# Patient Record
Sex: FEMALE | Race: AMERICAN INDIAN OR ALASKA NATIVE
[De-identification: names, ages, dates, MRNs, and addresses within clinical notes are randomized per-mention and may not be internally consistent; named-entity substitution may affect disease eponyms.]

---

## 2018-08-12 ENCOUNTER — HOSPITAL ENCOUNTER (EMERGENCY)
Dept: HOSPITAL 5 - ED | Age: 28
LOS: 5 days | Discharge: HOME | End: 2018-08-17
Payer: SELF-PAY

## 2018-08-12 DIAGNOSIS — Z88.0: ICD-10-CM

## 2018-08-12 DIAGNOSIS — F41.9: ICD-10-CM

## 2018-08-12 DIAGNOSIS — F31.9: Primary | ICD-10-CM

## 2018-08-12 DIAGNOSIS — F17.200: ICD-10-CM

## 2018-08-12 LAB
BASOPHILS # (AUTO): 0.1 K/MM3 (ref 0–0.1)
BASOPHILS NFR BLD AUTO: 0.8 % (ref 0–1.8)
BUN SERPL-MCNC: 9 MG/DL (ref 7–17)
BUN/CREAT SERPL: 10 %
CALCIUM SERPL-MCNC: 9.6 MG/DL (ref 8.4–10.2)
EOSINOPHIL # BLD AUTO: 0.2 K/MM3 (ref 0–0.4)
EOSINOPHIL NFR BLD AUTO: 1.4 % (ref 0–4.3)
HCT VFR BLD CALC: 29.2 % (ref 30.3–42.9)
HEMOLYSIS INDEX: 2
HGB BLD-MCNC: 8.7 GM/DL (ref 10.1–14.3)
LYMPHOCYTES # BLD AUTO: 3.6 K/MM3 (ref 1.2–5.4)
LYMPHOCYTES NFR BLD AUTO: 35.1 % (ref 13.4–35)
MCH RBC QN AUTO: 18 PG (ref 28–32)
MCHC RBC AUTO-ENTMCNC: 30 % (ref 30–34)
MCV RBC AUTO: 61 FL (ref 79–97)
MONOCYTES # (AUTO): 0.7 K/MM3 (ref 0–0.8)
MONOCYTES % (AUTO): 6.3 % (ref 0–7.3)
PLATELET # BLD: 530 K/MM3 (ref 140–440)
RBC # BLD AUTO: 4.81 M/MM3 (ref 3.65–5.03)

## 2018-08-12 PROCEDURE — 85025 COMPLETE CBC W/AUTO DIFF WBC: CPT

## 2018-08-12 PROCEDURE — 80320 DRUG SCREEN QUANTALCOHOLS: CPT

## 2018-08-12 PROCEDURE — 84484 ASSAY OF TROPONIN QUANT: CPT

## 2018-08-12 PROCEDURE — 96360 HYDRATION IV INFUSION INIT: CPT

## 2018-08-12 PROCEDURE — 96361 HYDRATE IV INFUSION ADD-ON: CPT

## 2018-08-12 PROCEDURE — 82550 ASSAY OF CK (CPK): CPT

## 2018-08-12 PROCEDURE — 99284 EMERGENCY DEPT VISIT MOD MDM: CPT

## 2018-08-12 PROCEDURE — 81001 URINALYSIS AUTO W/SCOPE: CPT

## 2018-08-12 PROCEDURE — 36415 COLL VENOUS BLD VENIPUNCTURE: CPT

## 2018-08-12 PROCEDURE — 83735 ASSAY OF MAGNESIUM: CPT

## 2018-08-12 PROCEDURE — 84703 CHORIONIC GONADOTROPIN ASSAY: CPT

## 2018-08-12 PROCEDURE — 80307 DRUG TEST PRSMV CHEM ANLYZR: CPT

## 2018-08-12 PROCEDURE — 80048 BASIC METABOLIC PNL TOTAL CA: CPT

## 2018-08-12 PROCEDURE — G0480 DRUG TEST DEF 1-7 CLASSES: HCPCS

## 2018-08-12 NOTE — EMERGENCY DEPARTMENT REPORT
History of Present Illness





- General


Chief Complaint: Overdose


Stated Complaint: SUICIDE ATTEMPT


Time Seen by Provider: 08/12/18 21:53


Source: patient, EMS


Mode of arrival: Stretcher


Limitations: No Limitations





- History of Present Illness


Initial Comments: 





Patient complains of having overdosed on several capsules of Benadryl, several 

tablets of clonazepam.  Patient is awake and alert but minimally cooperative, 

refuses to answer most questions.  Mother is in attendance, provides much of 

the pertinent history of circumstances.  Mother reports that patient has a long 

history of depression, with past suicide attempt approximately 10 years ago, 

has visited multiple counselors, diagnosed with depression, mother thinks the 

patient may be bipolar, but cannot recall a distinct diagnosis, the patient has 

routinely refused to take medications prescribed by her counselors.





Mother reports that patient does not talk much with mother also, but mother 

reports that patient has a daughter, with shared custody with father, and 

visits or held most weekends, but patient's child has not been with mother for 

the past couple of weekends, for unknown reasons.  Mother is not sure if 

patient has refused visited by child, or if visits have been withheld, but is 

unaware of circumstances or relationship with child's father today, due to 

patient's refusal to discuss much with mother.





Mother reports patient has not been sick recently, is significantly obese, but 

otherwise has no major health problems otherwise.





Nurse reports patient had taken a total of 26 Benadryl capsules, 50 mg each, as 

well as 6 Klonopin tablets, which had been prescribed for a friend.  There is 

no other report of additional ingestion.





- Related Data


 Previous Rx's











 Medication  Instructions  Recorded  Last Taken  Type


 


Clindamycin [Clindamycin CAP] 300 mg PO Q8H #21 cap 11/25/16 Unknown Rx


 


HYDROcodone/APAP 5-325 [[place] 1 each PO Q6HR PRN #6 tablet 11/25/16 Unknown Rx





5/325]    


 


Ibuprofen [Motrin 800 MG tab] 800 mg PO Q8HR PRN #25 tablet 11/25/16 Unknown Rx











 Allergies











Allergy/AdvReac Type Severity Reaction Status Date / Time


 


Penicillins Allergy  Unknown Verified 11/25/16 02:44














ED Review of Systems


ROS: 


Stated complaint: SUICIDE ATTEMPT


Other details as noted in HPI





Comment: Unobtainable due to pts medical conditions (patient refusal to assist)


Constitutional: no symptoms reported (per mother).  denies: chills, fever


Respiratory: no symptoms reported.  denies: cough, shortness of breath, wheezing


Cardiovascular: as per HPI (per mother).  denies: chest pain, palpitations


Gastrointestinal: as per HPI (per mother).  denies: abdominal pain, nausea, 

diarrhea


Psychiatric: depression (chronic, no recent observed changes in mental status, 

demeanor, or general condition, per mother)





ED Past Medical Hx





- Past Medical History


Previous Medical History?: Yes


Hx Hypertension: No


Hx CVA: No


Hx Heart Attack/AMI: No


Hx Congestive Heart Failure: No


Hx Diabetes: No


Hx Deep Vein Thrombosis: No


Hx Pulmonary Embolism: No


Hx GERD: No


Hx Liver Disease: No


Hx Renal Disease: No


Hx Sickle Cell Disease: No


Hx Arthritis: No


Hx Headaches / Migraines: No


Hx Seizures: No


Hx Kidney Stones: No


Hx Psychiatric Treatment: Yes (Anxiety and suicide attempt)


Hx Asthma: No


Hx COPD: No


Hx Dementia: No


Hx HIV: No





- Surgical History


Past Surgical History?: No


Hx Coronary Stent: No


Hx Open Heart Surgery: No


Hx Pacemaker: No


Hx Internal Defibrillator: No


Hx Cholecystectomy: No


Hx Appendectomy: No


Hx Breast Surgery: No





- Social History


Smoking Status: Current Every Day Smoker


Substance Use Type: None





- Medications


Home Medications: 


 Home Medications











 Medication  Instructions  Recorded  Confirmed  Last Taken  Type


 


Clindamycin [Clindamycin CAP] 300 mg PO Q8H #21 cap 11/25/16  Unknown Rx


 


HYDROcodone/APAP 5-325 [[place] 1 each PO Q6HR PRN #6 tablet 11/25/16  Unknown Rx





5/325]     


 


Ibuprofen [Motrin 800 MG tab] 800 mg PO Q8HR PRN #25 tablet 11/25/16  Unknown Rx














ED Physical Exam





- General


Limitations: Other (patient does not drink it, refuses to answer many questions

, but does allow brief physical examination)


General appearance: alert, in no apparent distress





- Head


Head exam: Present: atraumatic, normocephalic





- Eye


Eye exam: Present: PERRL (pupils are moderately dilated, 5 mm, equally 

responsive), EOMI





- ENT


ENT exam: Present: normal exam, mucous membranes moist





- Neck


Neck exam: Present: normal inspection, full ROM.  Absent: tenderness





- Respiratory


Respiratory exam: Present: normal lung sounds bilaterally.  Absent: respiratory 

distress





- Cardiovascular


Cardiovascular Exam: Present: regular rate, tachycardia (borderline tachycardia

, heart rate 104 at time of examination, 116 at admission)





- GI/Abdominal


GI/Abdominal exam: Present: soft, normal bowel sounds.  Absent: tenderness





- Rectal


Rectal exam: Present: deferred





- Extremities Exam


Extremities exam: Present: normal inspection





- Back Exam


Back exam: Present: normal inspection.  Absent: tenderness





- Neurological Exam


Neurological exam: Present: alert, oriented X3, CN II-XII intact.  Absent: 

motor sensory deficit





- Psychiatric


Psychiatric exam: Present: flat affect (refuses to talk much of the time, 

unable to assess mood and demeanor, clearly well oriented, alert and tracks 

well with environment, no obvious signs of confusion)





- Skin


Skin exam: Present: warm, dry, intact, normal color.  Absent: diaphoretic





ED Course


 Vital Signs











  08/12/18 08/12/18 08/12/18





  21:22 21:30 21:34


 


Temperature   37.9 C H


 


Pulse Rate 121 H 115 H 116 H


 


Respiratory 18 16 28 H





Rate   


 


Blood Pressure  155/90 155/90


 


O2 Sat by Pulse 100 100 100





Oximetry   














  08/12/18 08/12/18 08/12/18





  21:46 21:49 22:00


 


Temperature   


 


Pulse Rate 106 H  112 H


 


Respiratory 15 17 20





Rate   


 


Blood Pressure 159/87  159/87


 


O2 Sat by Pulse 100 100 100





Oximetry   














  08/12/18 08/12/18 08/12/18





  22:16 22:31 22:45


 


Temperature   


 


Pulse Rate 108 H 107 H 125 H


 


Respiratory 16 14 15





Rate   


 


Blood Pressure 159/87 159/87 159/87


 


O2 Sat by Pulse 100 100 100





Oximetry   














  08/12/18 08/12/18 08/12/18





  23:15 23:17 23:31


 


Temperature  37.6 C 


 


Pulse Rate   124 H


 


Respiratory 21  25 H





Rate   


 


Blood Pressure   


 


O2 Sat by Pulse   





Oximetry   














  08/12/18 08/13/18 08/13/18





  23:47 00:01 00:15


 


Temperature   


 


Pulse Rate   131 H


 


Respiratory   25 H





Rate   


 


Blood Pressure   


 


O2 Sat by Pulse 83 L 97 





Oximetry   














  08/13/18 08/13/18 08/13/18





  00:30 00:45 01:00


 


Temperature   


 


Pulse Rate 123 H 122 H 122 H


 


Respiratory 23 29 H 25 H





Rate   


 


Blood Pressure 148/92 151/90 145/83


 


O2 Sat by Pulse   





Oximetry   














  08/13/18 08/13/18 08/13/18





  01:15 01:30 01:45


 


Temperature   


 


Pulse Rate 121 H 119 H 119 H


 


Respiratory 30 H 30 H 31 H





Rate   


 


Blood Pressure 158/85 152/82 125/65


 


O2 Sat by Pulse   





Oximetry   














  08/13/18 08/13/18 08/13/18





  02:01 02:15 02:31


 


Temperature   


 


Pulse Rate 118 H 116 H 110 H


 


Respiratory 26 H 29 H 31 H





Rate   


 


Blood Pressure 122/81 147/84 152/82


 


O2 Sat by Pulse   





Oximetry   














  08/13/18 08/13/18 08/13/18





  02:45 03:01 03:15


 


Temperature   


 


Pulse Rate 114 H 118 H 115 H


 


Respiratory 28 H 27 H 31 H





Rate   


 


Blood Pressure 152/82 152/82 148/83


 


O2 Sat by Pulse   





Oximetry   














  08/13/18





  03:30


 


Temperature 


 


Pulse Rate 118 H


 


Respiratory 26 H





Rate 


 


Blood Pressure 158/72


 


O2 Sat by Pulse 





Oximetry 














- Reevaluation(s)


Reevaluation #1: 





08/13/18 05:54


Patient passed the night comfortably, with no acute distress, vital signs have 

remained stable, and patient is awake, alert, converses normally, asymptomatic, 

and stable physically on recheck, with repeat blood pressure 125/72, pulse 

remained in the tachycardic range at 108, with given patient's corpulent habitus

, minimal physical activity, she may be close to resting tachycardia anyway.  

She shows no overt signs of antihistamine toxicity.





Patient is much more forthcoming about her current circumstances, denies any 

actual suicide intent,  and confirms taking diphenhydramine and clonazepam, not 

sure of exact number of antihistamines; reports clonazepam was a low dose, 

tangerine color, only a few pills total.  





She reports ongoing frustration at custody issues over her daughter, and 

confirms that much of her current distress is result of not being allowed 

access to her daughter, and her current ongoing conflict with her daughter's 

father who has been refusing to allow patient visitation rights to her child, 

and that she was still not clear why she took the medications, but did not want 

to die.


08/13/18 05:58








ED Medical Decision Making





- Lab Data


Result diagrams: 


 08/12/18 22:08





 08/12/18 22:08





- EKG Data


-: EKG Interpreted by Me (sinus tachycardia, otherwise normal tracing, no ectopy

)


EKG shows normal: sinus rhythm, axis (QRS axis 10), intervals (QT interval 

normal 462 ms corrected), QRS complexes (normal QRS interval, 96 ms), ST-T 

waves (normal ST segments, with no acute elevations or depressions.)


Rate: tachycardia





- EKG Data


When compared to previous EKG there are: previous EKG unavailable


Critical Care Time: No


Critical care attestation.: 


If time is entered above; I have spent that time in minutes in the direct care 

of this critically ill patient, excluding procedure time.








ED Disposition


Clinical Impression: 


 Suicide attempt





Antihistamines overdose


Qualifiers:


 Encounter type: initial encounter Injury intent: intentional self-harm 

Qualified Code(s): T45.0X2A - Poisoning by antiallergic and antiemetic drugs, 

intentional self-harm, initial encounter





Condition: Stable


Referrals: 


PRIMARY CARE,MD [Primary Care Provider] - 3-5 Days


Time of Disposition: 06:02

## 2018-08-13 LAB
BACTERIA #/AREA URNS HPF: (no result) /HPF
BENZODIAZEPINES SCREEN,URINE: (no result)
BILIRUB UR QL STRIP: (no result)
BLOOD UR QL VISUAL: (no result)
CAOX CRY #/AREA URNS HPF: (no result) /[HPF]
METHADONE SCREEN,URINE: (no result)
OPIATE SCREEN,URINE: (no result)
PH UR STRIP: 6 [PH] (ref 5–7)
RBC #/AREA URNS HPF: > 182 /HPF (ref 0–6)
UROBILINOGEN UR-MCNC: < 2 MG/DL (ref ?–2)
WBC #/AREA URNS HPF: 13 /HPF (ref 0–6)

## 2018-08-13 NOTE — CONSULTATION
History of Present Illness





- Reason for Consult


Consult date: 08/13/18


Reason for consult: Mental Health Evaluation


Requesting physician: CHRISTINE FONG





- Chief Complaint


Chief complaint: 


"I was upset"














- History of Present Psychiatric Illness


28 y.o. AA female presenting to the ER for overdose. Today the patient is calm 

and cooperative during the assessment. She stated admitted to taking several 

Klonopin pills along with 26 Benadryl pills. She could not confirm the dosage 

of the pills when asked. She stated that she had been thinking about taking 

pills for several days prior to her actions yesterday. She stated that she was 

upset and felt hopeless because of situations stemming from custody issues with 

her child's father and other stressors in her life (financial burdens and 

family issues). She stated a past suicide attempt 10 yrs ago. She would not 

confirm or deny a suicide attempt when asked reference taking the pills (

Klonopin/Benadryl). At this time, the patient is more concerned about leaving 

the ER and going back to work. She stated that she takes care herself and 

mother. She stated that she share custody with her child's father. She denies SI

/HI's and AVH's. She stated that her sleep has been "off," but denies a poor 

appetite. She denies recreational drug use and alcohol consumption (etoh).    








Medications and Allergies


 Allergies











Allergy/AdvReac Type Severity Reaction Status Date / Time


 


Penicillins Allergy  Unknown Verified 11/25/16 02:44











 Home Medications











 Medication  Instructions  Recorded  Confirmed  Last Taken  Type


 


Clindamycin [Clindamycin CAP] 300 mg PO Q8H #21 cap 11/25/16  Unknown Rx


 


HYDROcodone/APAP 5-325 [[place] 1 each PO Q6HR PRN #6 tablet 11/25/16  Unknown Rx





5/325]     


 


Ibuprofen [Motrin 800 MG tab] 800 mg PO Q8HR PRN #25 tablet 11/25/16  Unknown Rx











Active Meds: 


Active Medications





Sodium Chloride (Nacl 0.9% 1000 Ml)  1,000 mls @ 125 mls/hr IV AS DIRECT CESARIO


   Last Admin: 08/13/18 00:36 Dose:  125 mls/hr











Past psychiatric history





- Past Medical History


Past Medical History: other (Pregnancy x 1 )


Past Surgical History: No surgical history





- past Psychiatric treatment and history


psychiatric treatment history: 


A suicide attempt 10 yrs ago. She denies a fam psy hx.








- Social History


Social history: lives with family





Mental Status Exam





- Vital signs


 Last Vital Signs











Temp  99.6 F   08/13/18 10:02


 


Pulse  118 H  08/13/18 03:30


 


Resp  16   08/13/18 10:02


 


BP  125/70   08/13/18 10:02


 


Pulse Ox  100   08/13/18 10:02














- Exam


Narrative exam: 


MSE:





 Appearance: calm, cooperative


 Behavior: regular eye contact


 Speech: regular rate and tone


 Mood: "okay"


 Affect: congruent to mood


 Thought Process: circumstantial                 


 Thought Content: denies SI/HI's and AVH's


 Motor Activity: sitting up in bed


 Cognition: A/O x 3


 Insight: variable 


 Judgment: variable 








Results


Result Diagrams: 


 08/12/18 22:08





 08/12/18 22:08


 Abnormal lab results











  08/12/18 08/12/18 08/12/18 Range/Units





  22:08 22:08 22:08 


 


WBC     (4.5-11.0)  K/mm3


 


Hgb     (10.1-14.3)  gm/dl


 


Hct     (30.3-42.9)  %


 


MCV     (79-97)  fl


 


MCH     (28-32)  pg


 


RDW     (13.2-15.2)  %


 


Plt Count     (140-440)  K/mm3


 


Lymph % (Auto)     (13.4-35.0)  %


 


Carbon Dioxide    20 L  (22-30)  mmol/L


 


Glucose    153 H  ()  mg/dL


 


Urine WBC (Auto)     (0.0-6.0)  /HPF


 


Salicylates  < 0.3 L    (2.8-20.0)  mg/dL


 


Acetaminophen   < 5.0 L   (10.0-30.0)  ug/mL














  08/12/18 08/13/18 Range/Units





  22:08 00:12 


 


WBC  11.2 H   (4.5-11.0)  K/mm3


 


Hgb  8.7 L   (10.1-14.3)  gm/dl


 


Hct  29.2 L   (30.3-42.9)  %


 


MCV  61 L   (79-97)  fl


 


MCH  18 L   (28-32)  pg


 


RDW  19.6 H   (13.2-15.2)  %


 


Plt Count  530 H   (140-440)  K/mm3


 


Lymph % (Auto)  35.1 H   (13.4-35.0)  %


 


Carbon Dioxide    (22-30)  mmol/L


 


Glucose    ()  mg/dL


 


Urine WBC (Auto)   13.0 H  (0.0-6.0)  /HPF


 


Salicylates    (2.8-20.0)  mg/dL


 


Acetaminophen    (10.0-30.0)  ug/mL








All other labs normal.








Assessment and Plan


Assessment and plan: 


Impression: MDD, Severe recurrent. Today the patient is calm and cooperative 

during the assessment. UDS is negative. . The patient minimize her 

actions. 





DDx: R/O Bipolar DO





Recommendation/Plan: Continue 1013 with placement to inpatient psy services. 

Discussed risk/benefits of antidepressants, the patient prefer therapy at this 

time.

## 2018-08-14 NOTE — PROGRESS NOTE
Subjective





- Reason for Consult


Consult date: 08/14/18


Reason for consult: Psychhiatry Follow-up





- Chief Complaint


Chief complaint: 


"I made a bad mistake"





28 y.o. AA female presenting to the ER for overdose. Today the patient is calm 

and cooperative during the assessment. She stated admitted to taking several 

Klonopin pills along with 26 Benadryl pills. Today the patient is calm and 

cooperative during the assessment. She stated that she should have made a 

better decision by calling the crisis hotline or reach out to friends. She 

stated that she has much to live for. She denies SI/HI's and AVH's. She is 

adamant that she does not want to take any medication for depression. 














Mental Status Exam





- Vital signs


 Last Vital Signs











Temp  98.3 F   08/13/18 21:57


 


Pulse  54 L  08/13/18 21:57


 


Resp  20   08/13/18 21:57


 


BP  100/45   08/13/18 21:57


 


Pulse Ox  97   08/13/18 21:57














- Exam


Narrative exam: 


MSE:





 Appearance: calm, cooperative


 Behavior: regular eye contact


 Speech: regular rate and tone


 Mood: "okay"


 Affect: congruent to mood


 Thought Process: circumstantial                 


 Thought Content: denies SI/HI's and AVH's


 Motor Activity: sitting up in bed


 Cognition: A/O x 3


 Insight: fair  


 Judgment: fair  








Assessment and Plan


Impression: MDD, Severe recurrent. Today the patient is calm and cooperative 

during the assessment. UDS is negative. . 





DDx: R/O Bipolar DO





Recommendation/Plan: Continue 1013 with placement to inpatient psy services. 

Discussed risk/benefits of antidepressants, the patient prefer therapy at this 

time.

## 2018-08-16 VITALS — DIASTOLIC BLOOD PRESSURE: 65 MMHG | SYSTOLIC BLOOD PRESSURE: 137 MMHG

## 2018-08-16 NOTE — PROGRESS NOTE
Subjective





- Reason for Consult


Consult date: 08/16/18


Reason for consult: Psychiatry Follow-up





- Chief Complaint


Chief complaint: 


"I've done some reflection about my life"





28 y.o. AA female presenting to the ER for overdose. Today the patient is calm 

and cooperative during the assessment. She stated that she has done some 

reflection about her life. She stated that she made a "terrible decision" by 

taking several pills prior to her arrival to the ER. Per collateral information 

from her mother Ms Rajendra Loyola, at [phone], she stated that her daughter 

was reaching out for attention and don't believe she wanted to kill herself. 

She stated that she will be her daughter's support system when she is 

discharged. She stated that she feel safe for her daughter to return home. The 

patient denies SI/HI's, AVH's and depression. She stated that she plan to 

follow up at [facility] for outpatient services (therapy) when discharged.

  








Mental Status Exam





- Vital signs


 Last Vital Signs











Temp  98.8 F   08/16/18 09:00


 


Pulse  88   08/16/18 09:00


 


Resp  18   08/16/18 09:00


 


BP  129/70   08/16/18 09:00


 


Pulse Ox  100   08/16/18 09:00














- Exam


Narrative exam: 


MSE:





 Appearance: calm, cooperative


 Behavior: regular eye contact


 Speech: regular rate and tone


 Mood: "okay"


 Affect: congruent to mood


 Thought Process: linear                  


 Thought Content: denies SI/HI's and AVH's


 Motor Activity: sitting up in bed


 Cognition: A/O x 3


 Insight: appropriate


 Judgment: appropriate 








Assessment and Plan


Impression: MDD, Severe recurrent. Today the patient is calm and cooperative 

during the assessment. UDS is negative. . The patient is no threat to 

self. 





DDx: R/O Bipolar DO





I. This screening and assessment is based on information collected from the 

following sources:


   


II.  SUICIDE RISK SCREENING (within last 30 days):


A.) Suicidal thoughts/behaviors: Yes





SUICIDE RISK ASSESSMENT


III.  FACTORS THAT INCREASE RISK:





A.) Demographic and Substance Use Factors: No





B.) Current/Recent Factors (within past 3 months):


Psychosocial/Environmental Factors: Life Stressors


Physical Illness: None


Cognitive/Psychological Factors: None


 


C.) Historical Factors: None





D.) Diagnostic/Symptom/Treatment Factors: None





E.) Acute Risk Factor Severity


(DESC; MILD/MOD/SEVERE): Mild





Other factors for this individual that increase risk: None





IV.  FACTORS THAT DECREASE RISK: 


Resilience/Protective Factors: Patient want to decrease her stress  


Other factors for this individual that decrease risk: Patient denies a desire 

to harm self





V. Clinician's Formulation of Risk and Determination of level of Care: 





This is a 28-year-old AA female who took several Phenergan/Klonopin pills prior 

to her arrival to the ER. She stated that she was not trying to kill herself 

other than being overwhelmed with life stressors. She acknowledged that she 

should have not taken the pills because that was a unsafe act. She stated that 

she will follow-up with outpatient psy services (therapy) once discharged. 

Since being hospitalized the patient has consistently denied the desire to harm 

herself. Additionally, she has become insightful about how to better address 

her current issues. The patient is not impaired by substance. She is able to 

take care of her ADLs and is not at imminent risk of harm to self or others. 

Consequently, it is the opinion of the treatment team that the patient is at 

low risk of suicide and does not meet criteria to continue an involuntary 

psychiatric hold.  





Estimation of Imminent Risk: Low due to the above explanation.





Determination of Level of Care based on Suicide Risk: Outpatient follow-up. 





Narrative description of clinical reasoning. Given the fact that the patient is 

willing to engage in outpatient psy services and have a supportive network (

mother), it is reasonable to expect that the patient will seek services. 

Furthermore, the patient appears future oriented and denies that her intention 

was to end her life. She is regretful of the decision and has several things in 

his life to look forward to. At this current time, she is not impulsive and 

does not have any risk factors to increase the likelihood of her impulsive 

behavior. Therefore, it is reasonable to expect that the patient will engage in 

outpatient psy services which will reduce further unsafe behaviors.  





VI.  Plan and Interventions based on Suicide Risk: This patient will likely be 

stepped down to an outpatient mental health center in the community upon 

discharge and follow-up within 7 days of her discharge from the hospital.





VII.  Discharge/After Hours Support Plan: Patient can return back to the ER, 

call 911 or crisis line if symptoms of depression, anxiety, or suicidality 

return. 





Recommendation/Plan: Rescind 1013. Discussed risk/benefits of antidepressants, 

the patient prefer therapy at this time. The patient can follow up with [facility] 

[facility] for outpatient psy services (therapy). Safety Contract completed 

with patient.

## 2018-08-16 NOTE — CONSULTATION
History of Present Illness





- Reason for Consult


Consult date: 08/16/18


Reason for consult: Psychiatry Follow-up





- Chief Complaint


Chief complaint: 


"I've done some reflecting"





28 y.o. AA female presenting to the ER for overdose. Today the patient is calm 

and cooperative during the assessment. She stated admitted to taking several 

Klonopin pills along with 26 Benadryl pills. Today the patient is calm and 

cooperative during the assessment. She stated that she has been reflecting on 

her life. She stated that she made a "terrible mistake" by taking several pills 

prior to her admission to the ER. Per collateral information from Rajendra Loyola 

her mother at [phone], she stated that her daughter was overwhelmed 

recently. She stated that she does not feel like her daughter wanted to kill 

herself by taking pills. She feels that her daughter was reaching out for help. 

She stated that she feel safe for her daughter to return home once discharged. 

The patient denies SI/HI's, AVH's, and depression. She stated that she plan to 

follow up with outpatient psy services (therapy) at [facility]. Per the 

record, the patinet has been pleasant since being admitted the ER. 








Medications and Allergies


 Allergies











Allergy/AdvReac Type Severity Reaction Status Date / Time


 


Penicillins Allergy  Unknown Verified 11/25/16 02:44











 Home Medications











 Medication  Instructions  Recorded  Confirmed  Last Taken  Type


 


Unobtainable  08/14/18 08/14/18 Unknown History











Active Meds: 


Active Medications





Sodium Chloride (Nacl 0.9% 1000 Ml)  1,000 mls @ 125 mls/hr IV AS DIRECT CESARIO


   Last Admin: 08/13/18 00:36 Dose:  125 mls/hr











Mental Status Exam





- Vital signs


 Last Vital Signs











Temp  98.8 F   08/16/18 09:00


 


Pulse  88   08/16/18 09:00


 


Resp  18   08/16/18 09:00


 


BP  129/70   08/16/18 09:00


 


Pulse Ox  100   08/16/18 09:00














Results


Result Diagrams: 


 08/12/18 22:08





 08/12/18 22:08


All other labs normal.








Assessment and Plan


Assessment and plan: 





I. This screening and assessment is based on information collected from the 

following sources:


   


II.  SUICIDE RISK SCREENING (within last 30 days):


A.) Suicidal thoughts/behaviors: Yes





SUICIDE RISK ASSESSMENT


III.  FACTORS THAT INCREASE RISK:


Impression: MDD, Severe recurrent. Today the patient is calm and cooperative 

during the assessment. UDS is negative. . The patient is no threat to 

self. 





DDx: R/O Bipolar DO





A.) Demographic and Substance Use Factors: Yes (Marijuana, Cocaine, and 

Marijuana)





B.) Current/Recent Factors (within past 3 months):


Psychosocial/Environmental Factors: Life Stressors


Physical Illness: None


Cognitive/Psychological Factors: None


 


C.) Historical Factors: None





D.) Diagnostic/Symptom/Treatment Factors: None





E.) Acute Risk Factor Severity


(DESC; MILD/MOD/SEVERE): Mild





Other factors for this individual that increase risk: None





IV.  FACTORS THAT DECREASE RISK: 


Resilience/Protective Factors: Patient want to decrease her stress and stop 

using recreational drugs 


Other factors for this individual that decrease risk: Patient denies a desire 

to harm self





V. Clinician's Formulation of Risk and Determination of level of Care: 





This is a 25-year-old white female who ingested multiple substance prior to her 

arrival to the ER. She stated that she was not trying to kill herself. She 

stated a hx of substance abuse. She acknowledged that she should have not 

ingested unknown substances. She stated that she will follow-up with outpatient 

psy/rehab services once discharged. Since being hospitalized the patient has 

consistently denied the desire to harm herself. Additionally, she has become 

insightful about how to better address her current issues. The patient is not 

impaired by substance. She is able to take care of her ADLs and is not at 

imminent risk of harm to self or others. Consequently, it is the opinion of the 

treatment team that the patient is at low risk of suicide and does not meet 

criteria to continue an involuntary psychiatric hold.  





Estimation of Imminent Risk: Low due to the above explanation.





Determination of Level of Care based on Suicide Risk: Outpatient follow-up. 





Narrative description of clinical reasoning. Given the fact that the patient is 

willing to engage in outpatient/rehab services care and has a supportive 

network (mother/father), it is reasonable to expect that the patient will seek 

services.  Furthermore, the patient appears future oriented and denies that her 

intention was to end her life. She is regretful of the decision and has several 

things in his life to look forward to. At this current time, she is not 

impulsive and does not have any risk factors to increase the likelihood of her 

impulsive behavior. Therefore, it is reasonable to expect that the patient will 

engage in outpatient/rehab services which will reduce further unsafe behaviors.

  





VI.  Plan and Interventions based on Suicide Risk: This patient will likely be 

stepped down to an outpatient mental health center in the community upon 

discharge and follow-up within 7 days of her discharge from the hospital.





VII.  Discharge/After Hours Support Plan: Patient can return back to the ER, 

call 911 or crisis line if symptoms of depression, anxiety, suicidality return. 





Recommendation/Plan: Continue 1013 with placement to inpatient psy services. 

Discussed risk/benefits of antidepressants, the patient prefer therapy at this 

time. The patient can follow up with [facility] for outpatient psy (therapy

)services. Safety Contract completed with patient.

## 2018-08-16 NOTE — EMERGENCY DEPARTMENT REPORT
HPI





- General


Chief Complaint: Overdose


Time Seen by Provider: 08/12/18 21:53





ED Past Medical Hx





- Past Medical History


Previous Medical History?: Yes


Hx Hypertension: No


Hx CVA: No


Hx Heart Attack/AMI: No


Hx Congestive Heart Failure: No


Hx Diabetes: No


Hx Deep Vein Thrombosis: No


Hx Pulmonary Embolism: No


Hx GERD: No


Hx Liver Disease: No


Hx Renal Disease: No


Hx Sickle Cell Disease: No


Hx Arthritis: No


Hx Headaches / Migraines: No


Hx Seizures: No


Hx Kidney Stones: No


Hx Psychiatric Treatment: Yes (Anxiety and suicide attempt)


Hx Asthma: No


Hx COPD: No


Hx Dementia: No


Hx HIV: No





- Surgical History


Past Surgical History?: No


Hx Coronary Stent: No


Hx Open Heart Surgery: No


Hx Pacemaker: No


Hx Internal Defibrillator: No


Hx Cholecystectomy: No


Hx Appendectomy: No


Hx Breast Surgery: No





- Social History


Smoking Status: Current Every Day Smoker


Substance Use Type: None





- Medications


Home Medications: 


 Home Medications











 Medication  Instructions  Recorded  Confirmed  Last Taken  Type


 


Unobtainable  08/14/18 08/14/18 Unknown History














ED Review of Systems


ROS: 


Stated complaint: SUICIDE ATTEMPT


Other details as noted in HPI





Constitutional: no symptoms reported (per mother).  denies: chills, fever


Respiratory: no symptoms reported.  denies: cough, shortness of breath, wheezing


Cardiovascular: as per HPI (per mother).  denies: chest pain, palpitations


Gastrointestinal: as per HPI (per mother).  denies: abdominal pain, nausea, 

diarrhea


Psychiatric: depression (chronic, no recent observed changes in mental status, 

demeanor, or general condition, per mother)





Physical Exam





- Physical Exam


Vital Signs: 


 Vital Signs











  08/12/18 08/12/18 08/12/18





  21:22 21:30 21:34


 


Temperature   100.2 F H


 


Pulse Rate 121 H 115 H 116 H


 


Respiratory 18 16 28 H





Rate   


 


Blood Pressure  155/90 155/90


 


Blood Pressure   





[Right]   


 


O2 Sat by Pulse 100 100 100





Oximetry   














  08/12/18 08/12/18 08/12/18





  21:46 21:49 22:00


 


Temperature   


 


Pulse Rate 106 H  112 H


 


Respiratory 15 17 20





Rate   


 


Blood Pressure 159/87  159/87


 


Blood Pressure   





[Right]   


 


O2 Sat by Pulse 100 100 100





Oximetry   














  08/12/18 08/12/18 08/12/18





  22:16 22:31 22:45


 


Temperature   


 


Pulse Rate 108 H 107 H 125 H


 


Respiratory 16 14 15





Rate   


 


Blood Pressure 159/87 159/87 159/87


 


Blood Pressure   





[Right]   


 


O2 Sat by Pulse 100 100 100





Oximetry   














  08/12/18 08/12/18 08/12/18





  23:15 23:17 23:31


 


Temperature  99.6 F 


 


Pulse Rate   124 H


 


Respiratory 21  25 H





Rate   


 


Blood Pressure   


 


Blood Pressure   





[Right]   


 


O2 Sat by Pulse   





Oximetry   














  08/12/18 08/13/18 08/13/18





  23:47 00:01 00:15


 


Temperature   


 


Pulse Rate   131 H


 


Respiratory   25 H





Rate   


 


Blood Pressure   


 


Blood Pressure   





[Right]   


 


O2 Sat by Pulse 83 L 97 





Oximetry   














  08/13/18 08/13/18 08/13/18





  00:30 00:45 01:00


 


Temperature   


 


Pulse Rate 123 H 122 H 122 H


 


Respiratory 23 29 H 25 H





Rate   


 


Blood Pressure 148/92 151/90 145/83


 


Blood Pressure   





[Right]   


 


O2 Sat by Pulse   





Oximetry   














  08/13/18 08/13/18 08/13/18





  01:15 01:30 01:45


 


Temperature   


 


Pulse Rate 121 H 119 H 119 H


 


Respiratory 30 H 30 H 31 H





Rate   


 


Blood Pressure 158/85 152/82 125/65


 


Blood Pressure   





[Right]   


 


O2 Sat by Pulse   





Oximetry   














  08/13/18 08/13/18 08/13/18





  02:01 02:15 02:31


 


Temperature   


 


Pulse Rate 118 H 116 H 110 H


 


Respiratory 26 H 29 H 31 H





Rate   


 


Blood Pressure 122/81 147/84 152/82


 


Blood Pressure   





[Right]   


 


O2 Sat by Pulse   





Oximetry   














  08/13/18 08/13/18 08/13/18





  02:45 03:01 03:13


 


Temperature   


 


Pulse Rate 114 H 118 H 119 H


 


Respiratory 28 H 27 H 23





Rate   


 


Blood Pressure 152/82 152/82 148/83


 


Blood Pressure   





[Right]   


 


O2 Sat by Pulse   





Oximetry   














  08/13/18 08/13/18 08/13/18





  03:15 03:16 03:30


 


Temperature   


 


Pulse Rate 115 H 123 H 118 H


 


Respiratory 31 H 28 H 26 H





Rate   


 


Blood Pressure 148/83 148/83 158/72


 


Blood Pressure   





[Right]   


 


O2 Sat by Pulse   





Oximetry   














  08/13/18 08/13/18 08/13/18





  03:33 03:45 04:01


 


Temperature   


 


Pulse Rate 118 H 106 H 106 H


 


Respiratory 21 24 30 H





Rate   


 


Blood Pressure 158/72 129/76 161/40


 


Blood Pressure   





[Right]   


 


O2 Sat by Pulse   





Oximetry   














  08/13/18 08/13/18 08/13/18





  04:15 04:31 04:45


 


Temperature   


 


Pulse Rate 103 H 112 H 102 H


 


Respiratory 20 26 H 15





Rate   


 


Blood Pressure 161/40 149/59 136/70


 


Blood Pressure   





[Right]   


 


O2 Sat by Pulse   





Oximetry   














  08/13/18 08/13/18 08/13/18





  05:00 05:15 05:31


 


Temperature   


 


Pulse Rate 102 H 99 H 106 H


 


Respiratory 25 H 30 H 22





Rate   


 


Blood Pressure 130/79 130/79 130/79


 


Blood Pressure   





[Right]   


 


O2 Sat by Pulse   





Oximetry   














  08/13/18 08/13/18 08/13/18





  05:45 06:00 06:15


 


Temperature   


 


Pulse Rate 102 H 98 H 98 H


 


Respiratory 20 22 23





Rate   


 


Blood Pressure 125/52 136/59 136/59


 


Blood Pressure   





[Right]   


 


O2 Sat by Pulse   





Oximetry   














  08/13/18 08/13/18 08/13/18





  06:31 06:45 07:01


 


Temperature   


 


Pulse Rate 96 H 99 H 113 H


 


Respiratory 25 H 34 H 18





Rate   


 


Blood Pressure 94/44 133/75 141/55


 


Blood Pressure   





[Right]   


 


O2 Sat by Pulse   





Oximetry   














  08/13/18 08/13/18 08/13/18





  07:15 07:31 07:45


 


Temperature   


 


Pulse Rate 102 H 101 H 94 H


 


Respiratory 25 H 21 18





Rate   


 


Blood Pressure 141/55 143/75 130/79


 


Blood Pressure   





[Right]   


 


O2 Sat by Pulse   





Oximetry   














  08/13/18 08/13/18 08/13/18





  08:03 10:02 21:57


 


Temperature  99.6 F 98.3 F


 


Pulse Rate   54 L


 


Respiratory 24 16 20





Rate   


 


Blood Pressure 130/79  


 


Blood Pressure  125/70 100/45





[Right]   


 


O2 Sat by Pulse  100 97





Oximetry   














  08/14/18 08/14/18 08/15/18





  10:00 21:40 10:02


 


Temperature  99.2 F 


 


Pulse Rate 83 98 H 88


 


Respiratory 18 18 





Rate   


 


Blood Pressure   137/76


 


Blood Pressure 136/82 126/64 





[Right]   


 


O2 Sat by Pulse 100 98 100





Oximetry   














  08/15/18 08/15/18 08/15/18





  10:30 10:35 12:42


 


Temperature 98.9 F  


 


Pulse Rate   


 


Respiratory  18 18





Rate   


 


Blood Pressure   


 


Blood Pressure   





[Right]   


 


O2 Sat by Pulse   97





Oximetry   














  08/15/18 08/15/18 08/16/18





  20:30 20:35 01:15


 


Temperature 99.2 F  


 


Pulse Rate 100 H  


 


Respiratory 18 14 18





Rate   


 


Blood Pressure   


 


Blood Pressure 134/73  





[Right]   


 


O2 Sat by Pulse 100 100 





Oximetry   














  08/16/18 08/16/18





  02:15 09:00


 


Temperature  98.8 F


 


Pulse Rate  88


 


Respiratory 18 18





Rate  


 


Blood Pressure  


 


Blood Pressure  129/70





[Right]  


 


O2 Sat by Pulse  100





Oximetry  














ED Course


 Vital Signs











  08/12/18 08/12/18 08/12/18





  21:22 21:30 21:34


 


Temperature   100.2 F H


 


Pulse Rate 121 H 115 H 116 H


 


Respiratory 18 16 28 H





Rate   


 


Blood Pressure  155/90 155/90


 


Blood Pressure   





[Right]   


 


O2 Sat by Pulse 100 100 100





Oximetry   














  08/12/18 08/12/18 08/12/18





  21:46 21:49 22:00


 


Temperature   


 


Pulse Rate 106 H  112 H


 


Respiratory 15 17 20





Rate   


 


Blood Pressure 159/87  159/87


 


Blood Pressure   





[Right]   


 


O2 Sat by Pulse 100 100 100





Oximetry   














  08/12/18 08/12/18 08/12/18





  22:16 22:31 22:45


 


Temperature   


 


Pulse Rate 108 H 107 H 125 H


 


Respiratory 16 14 15





Rate   


 


Blood Pressure 159/87 159/87 159/87


 


Blood Pressure   





[Right]   


 


O2 Sat by Pulse 100 100 100





Oximetry   














  08/12/18 08/12/18 08/12/18





  23:15 23:17 23:31


 


Temperature  99.6 F 


 


Pulse Rate   124 H


 


Respiratory 21  25 H





Rate   


 


Blood Pressure   


 


Blood Pressure   





[Right]   


 


O2 Sat by Pulse   





Oximetry   














  08/12/18 08/13/18 08/13/18





  23:47 00:01 00:15


 


Temperature   


 


Pulse Rate   131 H


 


Respiratory   25 H





Rate   


 


Blood Pressure   


 


Blood Pressure   





[Right]   


 


O2 Sat by Pulse 83 L 97 





Oximetry   














  08/13/18 08/13/18 08/13/18





  00:30 00:45 01:00


 


Temperature   


 


Pulse Rate 123 H 122 H 122 H


 


Respiratory 23 29 H 25 H





Rate   


 


Blood Pressure 148/92 151/90 145/83


 


Blood Pressure   





[Right]   


 


O2 Sat by Pulse   





Oximetry   














  08/13/18 08/13/18 08/13/18





  01:15 01:30 01:45


 


Temperature   


 


Pulse Rate 121 H 119 H 119 H


 


Respiratory 30 H 30 H 31 H





Rate   


 


Blood Pressure 158/85 152/82 125/65


 


Blood Pressure   





[Right]   


 


O2 Sat by Pulse   





Oximetry   














  08/13/18 08/13/18 08/13/18





  02:01 02:15 02:31


 


Temperature   


 


Pulse Rate 118 H 116 H 110 H


 


Respiratory 26 H 29 H 31 H





Rate   


 


Blood Pressure 122/81 147/84 152/82


 


Blood Pressure   





[Right]   


 


O2 Sat by Pulse   





Oximetry   














  08/13/18 08/13/18 08/13/18





  02:45 03:01 03:13


 


Temperature   


 


Pulse Rate 114 H 118 H 119 H


 


Respiratory 28 H 27 H 23





Rate   


 


Blood Pressure 152/82 152/82 148/83


 


Blood Pressure   





[Right]   


 


O2 Sat by Pulse   





Oximetry   














  08/13/18 08/13/18 08/13/18





  03:15 03:16 03:30


 


Temperature   


 


Pulse Rate 115 H 123 H 118 H


 


Respiratory 31 H 28 H 26 H





Rate   


 


Blood Pressure 148/83 148/83 158/72


 


Blood Pressure   





[Right]   


 


O2 Sat by Pulse   





Oximetry   














  08/13/18 08/13/18 08/13/18





  03:33 03:45 04:01


 


Temperature   


 


Pulse Rate 118 H 106 H 106 H


 


Respiratory 21 24 30 H





Rate   


 


Blood Pressure 158/72 129/76 161/40


 


Blood Pressure   





[Right]   


 


O2 Sat by Pulse   





Oximetry   














  08/13/18 08/13/18 08/13/18





  04:15 04:31 04:45


 


Temperature   


 


Pulse Rate 103 H 112 H 102 H


 


Respiratory 20 26 H 15





Rate   


 


Blood Pressure 161/40 149/59 136/70


 


Blood Pressure   





[Right]   


 


O2 Sat by Pulse   





Oximetry   














  08/13/18 08/13/18 08/13/18





  05:00 05:15 05:31


 


Temperature   


 


Pulse Rate 102 H 99 H 106 H


 


Respiratory 25 H 30 H 22





Rate   


 


Blood Pressure 130/79 130/79 130/79


 


Blood Pressure   





[Right]   


 


O2 Sat by Pulse   





Oximetry   














  08/13/18 08/13/18 08/13/18





  05:45 06:00 06:15


 


Temperature   


 


Pulse Rate 102 H 98 H 98 H


 


Respiratory 20 22 23





Rate   


 


Blood Pressure 125/52 136/59 136/59


 


Blood Pressure   





[Right]   


 


O2 Sat by Pulse   





Oximetry   














  08/13/18 08/13/18 08/13/18





  06:31 06:45 07:01


 


Temperature   


 


Pulse Rate 96 H 99 H 113 H


 


Respiratory 25 H 34 H 18





Rate   


 


Blood Pressure 94/44 133/75 141/55


 


Blood Pressure   





[Right]   


 


O2 Sat by Pulse   





Oximetry   














  08/13/18 08/13/18 08/13/18





  07:15 07:31 07:45


 


Temperature   


 


Pulse Rate 102 H 101 H 94 H


 


Respiratory 25 H 21 18





Rate   


 


Blood Pressure 141/55 143/75 130/79


 


Blood Pressure   





[Right]   


 


O2 Sat by Pulse   





Oximetry   














  08/13/18 08/13/18 08/13/18





  08:03 10:02 21:57


 


Temperature  99.6 F 98.3 F


 


Pulse Rate   54 L


 


Respiratory 24 16 20





Rate   


 


Blood Pressure 130/79  


 


Blood Pressure  125/70 100/45





[Right]   


 


O2 Sat by Pulse  100 97





Oximetry   














  08/14/18 08/14/18 08/15/18





  10:00 21:40 10:02


 


Temperature  99.2 F 


 


Pulse Rate 83 98 H 88


 


Respiratory 18 18 





Rate   


 


Blood Pressure   137/76


 


Blood Pressure 136/82 126/64 





[Right]   


 


O2 Sat by Pulse 100 98 100





Oximetry   














  08/15/18 08/15/18 08/15/18





  10:30 10:35 12:42


 


Temperature 98.9 F  


 


Pulse Rate   


 


Respiratory  18 18





Rate   


 


Blood Pressure   


 


Blood Pressure   





[Right]   


 


O2 Sat by Pulse   97





Oximetry   














  08/15/18 08/15/18 08/16/18





  20:30 20:35 01:15


 


Temperature 99.2 F  


 


Pulse Rate 100 H  


 


Respiratory 18 14 18





Rate   


 


Blood Pressure   


 


Blood Pressure 134/73  





[Right]   


 


O2 Sat by Pulse 100 100 





Oximetry   














  08/16/18 08/16/18





  02:15 09:00


 


Temperature  98.8 F


 


Pulse Rate  88


 


Respiratory 18 18





Rate  


 


Blood Pressure  


 


Blood Pressure  129/70





[Right]  


 


O2 Sat by Pulse  100





Oximetry  














- Reevaluation(s)


Reevaluation #1: 





08/16/18 17:42


I was called to reevaluate the patient by the psychiatric nurse practitioner.  

Patient was evaluated earlier by the nurse practitioner who recommended 

discharging her home.  I spoke with the patient and she agreed to take 

psychiatric medication at home.  She said that nobody told her that she would 

need to be on a psych medication and that she does not have any problem taking 

an medication that is prescribed to her. She said this is the second suicide 

attempt she is having in her life.  She also says she is going to a lot of 

financially problems and she feels that her family is not helping.  She did 

admit to taking Klonopin and Benadryl in an attempt to kill her self last 

Sunday.  Currently she is no longer having suicidal ideation.  However from my 

assessment I feel that patient should be on psychiatric medication.  After my 

assessment I did talk to do nurse practitioner and we discussed the patient 

care and I did verbalize my concern that for the patient to go home she should 

be on psychiatric medication.  He did agree with my medical assessment and that 

I would not discharge the patient at this time.  I will put back the 1013 on 

the patient pending a reassessment by the psychiatric team tomorrow morning. 

Patient will have another psychiatric evaluation tomorrow and the disposition 

will be decided after that evaluation.  Patient is medically stable at this 

time and she has no medical complaints currently.





ED Medical Decision Making





- Lab Data


Result diagrams: 


 08/12/18 22:08





 08/12/18 22:08


Critical care attestation.: 


If time is entered above; I have spent that time in minutes in the direct care 

of this critically ill patient, excluding procedure time.








ED Disposition


Clinical Impression: 


 Suicide attempt





Antihistamines overdose


Qualifiers:


 Encounter type: initial encounter Injury intent: intentional self-harm 

Qualified Code(s): T45.0X2A - Poisoning by antiallergic and antiemetic drugs, 

intentional self-harm, initial encounter





Disposition: DC/[id number] PSY HOSP/PSY UNIT


Is pt being admited?: No


Does the pt Need Aspirin: No


Condition: Stable


Referrals: 


[facility] [Outside] - 3-5 Days


[facility] [Primary Care Provider] - 3-5 Days

## 2018-08-17 NOTE — EMERGENCY DEPARTMENT REPORT
Blank Doc





- Documentation


Documentation: 


Patient is a 28-year-old female who presents emergency room originally with a 

suicide attempt with Phenergan and Klonopin.  Patient at this time is symptom 

free.  Patient states she is not having any auditory or visual hallucinations.  

Patient denies having suicidal or homicidal ideations.  Patient at this time 

appears to be psychiatrically and medically stable.  Patient agrees to suicide 

contract.  Patient agrees to follow up with outpatient psychiatry.  Patient 

states she prefers to do counseling and therapy versus doing pills.  At this 

time patient states she is feeling good and is ready to go home.  Patient will 

be discharged home with discharge instructions and return to ER precautions.  I 

discussed case at length with the mental health evaluators.  I agree with the 

mental health evaluators that the patient is stable for outpatient therapy

## 2018-08-17 NOTE — PROGRESS NOTE
Subjective





- Reason for Consult


Consult date: 08/17/18


Reason for consult: Psychiatry Follow-up





- Chief Complaint


Chief complaint: 


"Will I be discharged today" 





28 y.o. AA female presenting to the ER for overdose. Today the patient is calm 

and cooperative during the assessment. This patient's 1013 was rescinded 

yesterday. Per conversation with Dr Galeas the ER Physician who was going to 

discharge the patinet yesterday, he felt like the patient may need a 

prescription for medication. Per my notes on this patient, she prefer therapy 

other than taking medication at this time. The patient was informed about risk/

benefits of antidepressants every time she was seen while in the ER. A suicide 

risk assessment was completed on the patient along with a safety contract 8/16/ 2018. I discussed the outpatient treatment plan with Dr Patten the ER 

Physician today after assessing patient this morning. He agreed with the patient

's outpatient treatment plan. The patient denies SI/HI's and AVH's.  





Mental Status Exam





- Vital signs


 Last Vital Signs











Temp  98.7 F   08/16/18 20:00


 


Pulse  91 H  08/16/18 20:00


 


Resp  18   08/16/18 20:00


 


BP  137/65   08/16/18 20:00


 


Pulse Ox  98   08/16/18 20:00














- Exam


Narrative exam: 


MSE:





 Appearance: calm, cooperative


 Behavior: regular eye contact


 Speech: regular rate and tone


 Mood: "okay"


 Affect: congruent to mood


 Thought Process: linear                  


 Thought Content: denies SI/HI's and AVH's


 Motor Activity: sitting up in bed


 Cognition: A/O x 3


 Insight: appropriate


 Judgment: appropriate 








Assessment and Plan


Impression: MDD, Severe recurrent. Today the patient is calm and cooperative 

during the assessment. The patient is no threat to self. 





DDx: R/O Bipolar DO





Recommendation/Plan: Rescind 1013. Discussed risk/benefits of antidepressants, 

the patient prefer therapy at this time. The patient can follow up with [facility] 

[facility] for outpatient psy services (therapy). Safety Contract 

completed with patient.